# Patient Record
Sex: MALE | Race: WHITE | NOT HISPANIC OR LATINO | Employment: FULL TIME | URBAN - METROPOLITAN AREA
[De-identification: names, ages, dates, MRNs, and addresses within clinical notes are randomized per-mention and may not be internally consistent; named-entity substitution may affect disease eponyms.]

---

## 2021-05-30 ENCOUNTER — RECORDS - HEALTHEAST (OUTPATIENT)
Dept: ADMINISTRATIVE | Facility: CLINIC | Age: 21
End: 2021-05-30

## 2021-06-02 ENCOUNTER — RECORDS - HEALTHEAST (OUTPATIENT)
Dept: ADMINISTRATIVE | Facility: CLINIC | Age: 21
End: 2021-06-02

## 2024-03-11 ENCOUNTER — OFFICE VISIT (OUTPATIENT)
Dept: URGENT CARE | Facility: URGENT CARE | Age: 24
End: 2024-03-11
Payer: COMMERCIAL

## 2024-03-11 VITALS
OXYGEN SATURATION: 99 % | SYSTOLIC BLOOD PRESSURE: 132 MMHG | TEMPERATURE: 99 F | HEART RATE: 89 BPM | DIASTOLIC BLOOD PRESSURE: 84 MMHG | RESPIRATION RATE: 14 BRPM

## 2024-03-11 DIAGNOSIS — G44.209 TENSION HEADACHE: Primary | ICD-10-CM

## 2024-03-11 PROCEDURE — 99203 OFFICE O/P NEW LOW 30 MIN: CPT | Performed by: PHYSICIAN ASSISTANT

## 2024-03-12 NOTE — PROGRESS NOTES
Assessment & Plan     Tension headache  Discussed treatment options, patient will try a 3 day trial of NSAIDs 400mg of Ibuprofen tid and then go off and see if we have broken the cycle. He will follow up with his primary if ssx persist. Patient understands and is amenable to plan.      Return if symptoms worsen or fail to improve.    Subjective   Jesus is a 23 year old, presenting for the following health issues: Patient has had a constant headache for 1-2 weeks. It improves with 400 mg of ibuprofen, it goes away for the day. Has gone a day or two without taking it. He did try to go keto for a week to improve them and noticed only a slight improvement. Pain is in the forehead right above the eyes. No vision changes. No nausea or vomiting, no photophobia, phonophobia, aversion to smells. Pain is more dull achy, not sharp or shooting or electrical feeling.     Headache (Reoccurring for 1 1/2-2 weeks)         No data to display              HPI         Review of Systems  Constitutional, HEENT, cardiovascular, pulmonary, gi and gu systems are negative, except as otherwise noted.      Objective    /84 (BP Location: Right arm, Patient Position: Sitting, Cuff Size: Adult Regular)   Pulse 89   Temp 99  F (37.2  C) (Tympanic)   Resp 14   SpO2 99%   There is no height or weight on file to calculate BMI.  Physical Exam   GENERAL: alert and no distress  NEURO: Normal strength and tone, mentation intact and speech normal  PSYCH: mentation appears normal, affect normal/bright    No results found for this or any previous visit (from the past 24 hour(s)).        Signed Electronically by: Alicia Holland PA-C